# Patient Record
Sex: MALE | URBAN - METROPOLITAN AREA
[De-identification: names, ages, dates, MRNs, and addresses within clinical notes are randomized per-mention and may not be internally consistent; named-entity substitution may affect disease eponyms.]

---

## 2024-08-01 ENCOUNTER — APPOINTMENT (RX ONLY)
Dept: URBAN - METROPOLITAN AREA CLINIC 100 | Facility: CLINIC | Age: 44
Setting detail: DERMATOLOGY
End: 2024-08-01

## 2024-08-01 DIAGNOSIS — B07.8 OTHER VIRAL WARTS: ICD-10-CM

## 2024-08-01 DIAGNOSIS — L82.1 OTHER SEBORRHEIC KERATOSIS: ICD-10-CM

## 2024-08-01 PROCEDURE — 17110 DESTRUCTION B9 LES UP TO 14: CPT

## 2024-08-01 PROCEDURE — 99202 OFFICE O/P NEW SF 15 MIN: CPT | Mod: 25

## 2024-08-01 PROCEDURE — ? LIQUID NITROGEN

## 2024-08-01 PROCEDURE — ? COUNSELING

## 2024-08-01 ASSESSMENT — LOCATION DETAILED DESCRIPTION DERM
LOCATION DETAILED: RIGHT RADIAL PALM
LOCATION DETAILED: RIGHT MID DORSAL RING FINGER
LOCATION DETAILED: RIGHT PROXIMAL PALMAR RING FINGER
LOCATION DETAILED: RIGHT THENAR EMINENCE
LOCATION DETAILED: RIGHT RADIAL DORSAL HAND
LOCATION DETAILED: LEFT PROXIMAL DORSAL MIDDLE FINGER
LOCATION DETAILED: RIGHT MID DORSAL MIDDLE FINGER
LOCATION DETAILED: RIGHT DORSAL RING METACARPOPHALANGEAL JOINT
LOCATION DETAILED: LEFT MIDDLE PROXIMAL INTERPHALANGEAL JOINT
LOCATION DETAILED: RIGHT PROXIMAL PALMAR INDEX FINGER
LOCATION DETAILED: LEFT ULNAR DORSAL HAND

## 2024-08-01 ASSESSMENT — LOCATION SIMPLE DESCRIPTION DERM
LOCATION SIMPLE: RIGHT HAND
LOCATION SIMPLE: RIGHT RING FINGER
LOCATION SIMPLE: LEFT HAND
LOCATION SIMPLE: RIGHT INDEX FINGER
LOCATION SIMPLE: LEFT MIDDLE FINGER
LOCATION SIMPLE: RIGHT MIDDLE FINGER

## 2024-08-01 ASSESSMENT — LOCATION ZONE DERM
LOCATION ZONE: HAND
LOCATION ZONE: FINGER

## 2024-08-01 NOTE — PROCEDURE: LIQUID NITROGEN
Show Spray Paint Technique Variable?: Yes
Detail Level: Detailed
Include Z78.9 (Other Specified Conditions Influencing Health Status) As An Associated Diagnosis?: No
Medical Necessity Clause: This procedure was medically necessary because the lesions that were treated were:
Number Of Freeze-Thaw Cycles: 3 freeze-thaw cycles
Medical Necessity Information: It is in your best interest to select a reason for this procedure from the list below. All of these items fulfill various CMS LCD requirements except the new and changing color options.
Consent: The patient's consent was obtained including but not limited to risks of crusting, scabbing, blistering, scarring, darker or lighter pigmentary change, recurrence, incomplete removal and infection.
Spray Paint Text: The liquid nitrogen was applied to the skin utilizing a spray paint frosting technique.
Post-Care Instructions: I reviewed with the patient in detail post-care instructions. Patient is to wear sunprotection, and avoid picking at any of the treated lesions. Pt may apply Vaseline to crusted or scabbing areas.

## 2024-08-22 ENCOUNTER — APPOINTMENT (RX ONLY)
Dept: URBAN - METROPOLITAN AREA CLINIC 100 | Facility: CLINIC | Age: 44
Setting detail: DERMATOLOGY
End: 2024-08-22

## 2024-08-22 DIAGNOSIS — L82.1 OTHER SEBORRHEIC KERATOSIS: ICD-10-CM

## 2024-08-22 DIAGNOSIS — B07.8 OTHER VIRAL WARTS: ICD-10-CM

## 2024-08-22 PROCEDURE — ? COUNSELING

## 2024-08-22 PROCEDURE — 99212 OFFICE O/P EST SF 10 MIN: CPT | Mod: 25

## 2024-08-22 PROCEDURE — 17110 DESTRUCTION B9 LES UP TO 14: CPT

## 2024-08-22 PROCEDURE — ? ELECTRODESICCATION

## 2024-08-22 ASSESSMENT — LOCATION DETAILED DESCRIPTION DERM
LOCATION DETAILED: LEFT DORSAL MIDDLE METACARPOPHALANGEAL JOINT
LOCATION DETAILED: RIGHT MID DORSAL MIDDLE FINGER
LOCATION DETAILED: LEFT MID DORSAL MIDDLE FINGER
LOCATION DETAILED: RIGHT RADIAL DORSAL HAND
LOCATION DETAILED: LEFT ULNAR DORSAL HAND
LOCATION DETAILED: LEFT MIDDLE PROXIMAL INTERPHALANGEAL JOINT
LOCATION DETAILED: LEFT PROXIMAL DORSAL MIDDLE FINGER

## 2024-08-22 ASSESSMENT — LOCATION SIMPLE DESCRIPTION DERM
LOCATION SIMPLE: LEFT MIDDLE FINGER
LOCATION SIMPLE: RIGHT HAND
LOCATION SIMPLE: RIGHT MIDDLE FINGER
LOCATION SIMPLE: LEFT HAND

## 2024-08-22 ASSESSMENT — LOCATION ZONE DERM
LOCATION ZONE: HAND
LOCATION ZONE: FINGER

## 2024-08-22 NOTE — PROCEDURE: ELECTRODESICCATION
Include Z78.9 (Other Specified Conditions Influencing Health Status) As An Associated Diagnosis?: No
Anesthesia Type: 1% lidocaine with epinephrine
Detail Level: Simple
Post-Care Instructions: I reviewed with the patient in detail post-care instructions. Patient is to wear sunprotection, and avoid picking at any of the treated lesions. Pt may apply Vaseline to crusted or scabbing areas
Medical Necessity Information: It is in your best interest to select a reason for this procedure from the list below. All of these items fulfill various CMS LCD requirements except the new and changing color options.
Consent: The patient's consent was obtained including but not limited to risks of crusting, scabbing, blistering, scarring, darker or lighter pigmentary change, recurrence, incomplete removal and infection.
Medical Necessity Clause: This procedure was medically necessary because the lesions that were treated were: